# Patient Record
(demographics unavailable — no encounter records)

---

## 2024-10-11 NOTE — ASSESSMENT
[Diabetes Foot Care] : diabetes foot care [Long Term Vascular Complications] : long term vascular complications of diabetes [Carbohydrate Consistent Diet] : carbohydrate consistent diet [Importance of Diet and Exercise] : importance of diet and exercise to improve glycemic control, achieve weight loss and improve cardiovascular health [Exercise/Effect on Glucose] : exercise/effect on glucose [Hypoglycemia Management] : hypoglycemia management [Glucagon Use] : glucagon use [Ketone Testing] : ketone testing [Action and use of Insulin] : action and use of short and long-acting insulin [Self Monitoring of Blood Glucose] : self monitoring of blood glucose [Insulin Self-Administration] : insulin self-administration [Injection Technique, Storage, Sharps Disposal] : injection technique, storage, and sharps disposal [Sick-Day Management] : sick-day management [Retinopathy Screening] : Patient was referred to ophthalmology for retinopathy screening [Diabetic Medications] : Risks and benefits of diabetic medications were discussed [FreeTextEntry1] : T2DM, well controlled - keep off tresiba - Mounajro 12.5 mg qw  - can decrease Metformin 1000 mg 1/2 tab bid and monitor for GI effects (prior w/ some GI intolerance on metformin) - Erythrocytosis could possibly be related to SGLT2 inhibitors - continue Jardiance 10 mg qd  - hypo's precautions reviewed - d/c diovan/HCT and start Diovan 80 mg qd and do home BP monitoring - Crestor 10mg HS - monitor urine microalbumin - cont drisdol 50K q 10 days - Connie 3 - podiatry f/u - advised to d/w PCP further eval for liver cyst  RTC 6 mos, labs in 3 months

## 2024-10-11 NOTE — HISTORY OF PRESENT ILLNESS
[FreeTextEntry1] : 56 yo M f/u for diabetes management  *** Oct 11, 2024 ***  Doing great, offers no new complaints.  Blood pressure has been stable at home.  Regained a few pounds. off Tresiba staying on Mounajro 12.5 mg qw,  Metformin 1000 mg bid, Jardiance 10 mg qd, diovan/HCT  80/12.5mg qd wearing Connie Date of download:  10/11/2024  Diabetes Medications and Dosage: as above Indication for CGMS: verify a change in the treatment regimen, identify periods of hypoglycemia/ hyperglycemia.  Modal day report: pattern.  Pt with HYPO  1% of the time ( NONE below 54),  98% in target range Hyperglycemia:  1% elevation  Identified issues: Falsely low numbers for 1 day at the time of a sensor change. dates analyzed: 09/28/2024 - 10/11/2024  *** Mar 15, 2024 ***  feels well, no new c/o.  weight has been stable taking Tresiba 10 un HS, Mounjaro 12.5 mg qw, Metformin 1000 mg bid,  Jardiance 25 mg qd,diovan/HCT  80/12.5mg qd home BP readings  ave 120's/80's wearing Connie Date of download:  03/15/2024  Diabetes Medications and Dosage: as above Indication for CGMS: verify a change in the treatment regimen, identify periods of hypoglycemia/ hyperglycemia.  Modal day report: pattern.  Pt with HYPO  0% of the time ( NONE below 54),  100% in target range Hyperglycemia:  0% elevation  Identified issues: carbohydrate counting dates analyzed: 03/02/2024 - 03/15/2024  labs reviewed as below  *** Sep 15, 2023 ***  doing great. able to tolerate Mounjaro (on and off  mild diarrhea 36 hrs post injection, very manageable)  saw hepatologist - dx'd with Jimmy CUELLO abd- several simple hepatic cysts; advised to repeat in 1 year decreased Tresiba to 15 un HS, staying on Mounjaro 12.5 mg qw, Metformin 1000 mg bid,  Jardiance 25 mg qd  wearing Connie 3 Date of download:  09/15/2023  Diabetes Medications and Dosage: as above Indication for CGMS: verify a change in the treatment regimen, identify periods of hypoglycemia/ hyperglycemia.  Modal day report: pattern.  Pt with HYPO  0% of the time ( NONE below 54),  100% in target range Hyperglycemia:  0% elevation  Identified issues: carbohydrate counting dates analyzed:  09/02/2023- 09/15/2023  *** Feb 10, 2023 ***  feels great. lost weight on Mounjaro  tresiba 26 un HS, Mounajro 7.5 mg qw,  Metformin 1000 mg bid,  Jardiance 25 mg qd wearing connie 3 Date of download:  02/10/2023  Diabetes Medications and Dosage: as above Indication for CGMS: verify a change in the treatment regimen, identify periods of hypoglycemia/ hyperglycemia.  Modal day report: pattern.  Pt with HYPO  1% of the time ( NONE below 54),  98% in target range Hyperglycemia:  1% elevation  Identified issues: carbohydrate counting dates analyzed: 01/28/2023 - 02/10/2023   *** Oct 07, 2022 ***  doing well. no new c/o. difficulties losing weight. on Tresiba 26 un HS, janumet  50/1000mg bid,  Jardiance 25 mg qd,  diovan/HCT  80/12.5mg bid, crestor 10mg HS Date of download:  10/07/2022  Diabetes Medications and Dosage: as above Indication for CGMS: verify a change in the treatment regimen, identify periods of hypoglycemia/ hyperglycemia.  Modal day report: pattern.  Pt with HYPO  0% of the time ( NONE below 54),  98% in target range Hyperglycemia:  2% elevation  Identified issues: carbohydrate counting dates analyzed: 09/24/2022 - 10/07/2022   *** Mar 31, 2022 ***  taking  Tresiba 22  un HS, cont  janumet  50/1000mg bid,  Jardiance 25 mg qd,  diovan/HCT  80/12.5mg bid, crestor 10mg HS  Date of download:  3/31/22 Diabetes Medications and Dosage: as above Indication for CGMS: verify a change in the treatment regimen, identify periods of hypoglycemia/ hyperglycemia.  Modal day report: pattern.  Pt with HYPO  0% of the time (NONE below 54),  78% in target range Hyperglycemia:  22% elevation  Identified issues: carbohydrate counting dates analyzed:3/18/22-3/31/22   *** Sep 30, 2021 ***  on Tresiba 22  un HS, janumet  50/1000mg bid,  Jardiance 10 mg qd, diovan/HCT  80/12.5mg bid, crestor 10mg HS feels fine, diet is a bit worse. weight is stable. has a dog, plans to walk more with her wearing connie Date of download:  9/30/21 Diabetes Medications and Dosage: as above Indication for CGMS: verify a change in the treatment regimen, identify periods of hypoglycemia/ hyperglycemia.  Modal day report: pattern.  Pt with HYPO  0% of the time (NONE below 54),  90% in target range Hyperglycemia:  10% elevation  Identified issues: carbohydrate counting, spikes post dinner dates analyzed: 9/17/21-9/30/21    *** Apr 27, 2021 ***  feels fine. vaccinated with pfizer on Tresiba 22  un HS, cont  janumet  50/1000mg bid,  steglatro 15mg qd, diovan/HCT  80/12.5mg bid, crestor 10mg HS wearing connie Date of download:  4/27/21 Diabetes Medications and Dosage: as above Indication for CGMS: verify a change in the treatment regimen, identify periods of hypoglycemia/ hyperglycemia.  Modal day report: pattern.  Pt with HYPO  0% of the time (NONE below 54),  92% in target range Hyperglycemia:  8% elevation  Identified issues: carbohydrate counting, occasional post dinner spikes dates analyzed: 4/14/21-4/27/21  labs from 4/21/21- a1c- 6.6, LDL_ 61, normal TSH  *** Sep 14, 2020 ***  on Tresiba 22  un HS, cont  janumet  50/1000mg bid,  steglatro 15mg qd, diovan/HCT  80/12.5mg bid, crestor 10mg HS  Date of download:  9/14/20 Diabetes Medications and Dosage: as above Indication for CGMS: verify a change in the treatment regimen, identify periods of hypoglycemia/ hyperglycemia.  Modal day report: pattern.  Pt with HYPO 0 % of the time (NONE below 54),  99% in target range Hyperglycemia:  1% elevation  Identified issues: occasional dietary changes dates analyzed: 9/1/20-9/14/20   *** Televisit  Jun 04, 2020 ***  on  Tresiba 22  un HS, cont  janumet  50/1000mg bid,  steglatro 15mg qd, diovan/HCT  80/12.5mg bid, crestor 10mg HS  a1c- 6.1, LDL- 55  Date of download:  5/22/20-6/4/20 Diabetes Medications and Dosage: as above Indication for CGMS: verify a change in the treatment regimen, identify periods of hypoglycemia/ hyperglycemia.  Modal day report: pattern.  Pt with HYPO  1% of the time (NONE below 54),  97% in target range Hyperglycemia:  2% elevation  Identified issues:per patient, rechecked FS when connie showed hypos- was 90's dates analyzed:6/4/20   *** Feb 27, 2020 ***  on  Tresiba 24  un HS, janumet  50/1000mg bid,  steglatro 15mg qd , diovan 80/12.5 qd only a1c- 6.2, LDL- 51, TG- 173 feels well. home BP reading SBP in 130's, diastolic in upper 80's  Date of download: 2/27/20  Diabetes Medications and Dosage: as above Indication for CGMS: verify a change in the treatment regimen, identify periods of hypoglycemia/ hyperglycemia.  Modal day report: pattern.  Pt with HYPO  2% of the time (NONE below 54),  97% in target range Hyperglycemia:  1% elevation  Identified issues: carbohydrate counting, few 70's at midnight dates analyzed: 2/14/20- 2/27/20    *** Nov 21, 2019 ***  on  Tresiba 28  un HS, cont  janumet  50/1000mg bid,  steglatro 15mg qd  a1c- 6.1  Date of download:  11/21/19 Diabetes Medications and Dosage: as above Indication for CGMS: verify a change in the treatment regimen, identify periods of hypoglycemia/ hyperglycemia.  Modal day report: pattern.  Pt with HYPO  1% of the time (NONE below 54),  98% in target range Hyperglycemia:  1% elevation  Identified issues: occas low 70's at 2-3 am dates analyzed: 11/8/19-11/21/19   *** Aug 19, 2019 *** on Tresiba 30  un HS,  janumet  50/1000mg bid, steglatro 15mg qd, crestor 10 mg, diovan/HCT  80/12.5mg bid able to tolerate higher dose of janumet within past 3 weeks, prior was taking a pill and half   LDL- 73 <-- 59 <-- 160 a1c-6.2 <-- 5.8 <-- 5.7 <--  5.8 <-- 12.8,  f.am- 228 25D- 29 <-- 40 <--  16 LDL- 55 <-- 68, HDL- 36 <- 36  log: FS- , ac D- , HS -    *** May 16, 2019 ***  feels well. had  colonoscopy- reports nl  urine microalbumins - 425 log : FBS- , ppg-    HISTORY OF PRESENT ILLNESS.    Patient was diagnosed with Diabetes Mellitus Type 2 in 01/2012.    Denies known complications of retinopathy, nephropathy, or neuropathy.   Reports  history  HTN, dyslipidemia. Denies CAD.  previously seen by Dr. Johnson, but has not followed for more than a year.  He's been prior on metformin 1000mg  with dinner, januvia 100mg, amaryl 4mg bid,  losartan 50mg, zocor  20mg. He has ran out of all medications for about 10 months.   Reports some GI intolerance on higher dose of metformin. Diet: not following.  Exercise: none.  **** Last dilated eye exam - 3/4/22 Last podiatry visit - 8/20 (Dr. Bhatt).   Last cardiology evaluation - 7/22 Last stress test - 9/22 Last 2-D Echo - 8/22 carotid doppler - 8/22

## 2025-04-11 NOTE — ASSESSMENT
[Diabetes Foot Care] : diabetes foot care [Long Term Vascular Complications] : long term vascular complications of diabetes [Carbohydrate Consistent Diet] : carbohydrate consistent diet [Importance of Diet and Exercise] : importance of diet and exercise to improve glycemic control, achieve weight loss and improve cardiovascular health [Exercise/Effect on Glucose] : exercise/effect on glucose [Hypoglycemia Management] : hypoglycemia management [Glucagon Use] : glucagon use [Ketone Testing] : ketone testing [Action and use of Insulin] : action and use of short and long-acting insulin [Self Monitoring of Blood Glucose] : self monitoring of blood glucose [Insulin Self-Administration] : insulin self-administration [Injection Technique, Storage, Sharps Disposal] : injection technique, storage, and sharps disposal [Sick-Day Management] : sick-day management [Retinopathy Screening] : Patient was referred to ophthalmology for retinopathy screening [Diabetic Medications] : Risks and benefits of diabetic medications were discussed [FreeTextEntry1] : T2DM, well controlled - keep off tresiba - Mounajro 12.5 mg qw  - Metformin 500 mg  bid and monitor for GI effects (prior w/ some GI intolerance on metformin) - Erythrocytosis could possibly be related to SGLT2 inhibitors - decrease Jardiance 10 mg qod , add ASA 81 mg. retest H/H in 3 months - hypo's precautions reviewed - cont diovan/HCT and do home BP monitoring - Crestor 10mg HS - monitor urine microalbumin - cont drisdol 50K q 10 days - Connie 3 - podiatry f/u - advised to d/w PCP further eval for liver cyst  - colonoscopy this year - annual skin check RTC 6 mos, labs in 3 months (CBC)

## 2025-04-11 NOTE — HISTORY OF PRESENT ILLNESS
[FreeTextEntry1] : 59 yo M f/u for diabetes management  *** Apr 11, 2025 ***  feels well, no new concerns went back to diovan-HCT b/o BP was higher no lightheadedness  remains on Mounajro 12.5 mg qw,  Metformin 1000 mg 1/2 tab bid, Jardiance 10 mg qd, diovan/HCT  80/12.5mg qd labs reviewed as below- a1c- 5.6, Hgb- 17.4 Name: Rich Zamora YOB: 1966 Report Period: 03/28/2025 - 04/10/2025 (14 days) Generated: 04/11/2025 Time CGM Active: 96%   Glucose Statistics and Targets Average Glucose: 135 mg/dL Glucose Management Indicator (GMI): 6.5% Glucose Variability (%CV): 14.7% Target Range: 70 - 180 mg/dL   Time in Ranges Very High: >250 mg/dL --- 0% High: 181 - 250 mg/dL --- 2% Target Range: 70 - 180 mg/dL --- 98% Low: 54 - 69 mg/dL --- 0% Very Low: <54 mg/dL --- 0%  *** Oct 11, 2024 ***  Doing great, offers no new complaints.  Blood pressure has been stable at home.  Regained a few pounds. off Tresiba staying on Mounajro 12.5 mg qw,  Metformin 1000 mg bid, Jardiance 10 mg qd, diovan/HCT  80/12.5mg qd wearing Elvin Date of download:  10/11/2024  Diabetes Medications and Dosage: as above Indication for CGMS: verify a change in the treatment regimen, identify periods of hypoglycemia/ hyperglycemia.  Modal day report: pattern.  Pt with HYPO  1% of the time ( NONE below 54),  98% in target range Hyperglycemia:  1% elevation  Identified issues: Falsely low numbers for 1 day at the time of a sensor change. dates analyzed: 09/28/2024 - 10/11/2024  *** Mar 15, 2024 ***  feels well, no new c/o.  weight has been stable taking Tresiba 10 un HS, Mounjaro 12.5 mg qw, Metformin 1000 mg bid,  Jardiance 25 mg qd,diovan/HCT  80/12.5mg qd home BP readings  ave 120's/80's wearing Elvin Date of download:  03/15/2024  Diabetes Medications and Dosage: as above Indication for CGMS: verify a change in the treatment regimen, identify periods of hypoglycemia/ hyperglycemia.  Modal day report: pattern.  Pt with HYPO  0% of the time ( NONE below 54),  100% in target range Hyperglycemia:  0% elevation  Identified issues: carbohydrate counting dates analyzed: 03/02/2024 - 03/15/2024  labs reviewed as below  *** Sep 15, 2023 ***  doing great. able to tolerate Mounjaro (on and off  mild diarrhea 36 hrs post injection, very manageable)  saw hepatologist - dx'd with Jimmy CUELLO abd- several simple hepatic cysts; advised to repeat in 1 year decreased Tresiba to 15 un HS, staying on Mounjaro 12.5 mg qw, Metformin 1000 mg bid,  Jardiance 25 mg qd  wearing Elvin 3 Date of download:  09/15/2023  Diabetes Medications and Dosage: as above Indication for CGMS: verify a change in the treatment regimen, identify periods of hypoglycemia/ hyperglycemia.  Modal day report: pattern.  Pt with HYPO  0% of the time ( NONE below 54),  100% in target range Hyperglycemia:  0% elevation  Identified issues: carbohydrate counting dates analyzed:  09/02/2023- 09/15/2023  *** Feb 10, 2023 ***  feels great. lost weight on Mounjaro  tresiba 26 un HS, Mounajro 7.5 mg qw,  Metformin 1000 mg bid,  Jardiance 25 mg qd wearing elvin 3 Date of download:  02/10/2023  Diabetes Medications and Dosage: as above Indication for CGMS: verify a change in the treatment regimen, identify periods of hypoglycemia/ hyperglycemia.  Modal day report: pattern.  Pt with HYPO  1% of the time ( NONE below 54),  98% in target range Hyperglycemia:  1% elevation  Identified issues: carbohydrate counting dates analyzed: 01/28/2023 - 02/10/2023   *** Oct 07, 2022 ***  doing well. no new c/o. difficulties losing weight. on Tresiba 26 un HS, janumet  50/1000mg bid,  Jardiance 25 mg qd,  diovan/HCT  80/12.5mg bid, crestor 10mg HS Date of download:  10/07/2022  Diabetes Medications and Dosage: as above Indication for CGMS: verify a change in the treatment regimen, identify periods of hypoglycemia/ hyperglycemia.  Modal day report: pattern.  Pt with HYPO  0% of the time ( NONE below 54),  98% in target range Hyperglycemia:  2% elevation  Identified issues: carbohydrate counting dates analyzed: 09/24/2022 - 10/07/2022   *** Mar 31, 2022 ***  taking  Tresiba 22  un HS, cont  janumet  50/1000mg bid,  Jardiance 25 mg qd,  diovan/HCT  80/12.5mg bid, crestor 10mg HS  Date of download:  3/31/22 Diabetes Medications and Dosage: as above Indication for CGMS: verify a change in the treatment regimen, identify periods of hypoglycemia/ hyperglycemia.  Modal day report: pattern.  Pt with HYPO  0% of the time (NONE below 54),  78% in target range Hyperglycemia:  22% elevation  Identified issues: carbohydrate counting dates analyzed:3/18/22-3/31/22   *** Sep 30, 2021 ***  on Tresiba 22  un HS, janumet  50/1000mg bid,  Jardiance 10 mg qd, diovan/HCT  80/12.5mg bid, crestor 10mg HS feels fine, diet is a bit worse. weight is stable. has a dog, plans to walk more with her wearing elvin Date of download:  9/30/21 Diabetes Medications and Dosage: as above Indication for CGMS: verify a change in the treatment regimen, identify periods of hypoglycemia/ hyperglycemia.  Modal day report: pattern.  Pt with HYPO  0% of the time (NONE below 54),  90% in target range Hyperglycemia:  10% elevation  Identified issues: carbohydrate counting, spikes post dinner dates analyzed: 9/17/21-9/30/21    *** Apr 27, 2021 ***  feels fine. vaccinated with pfizer on Tresiba 22  un HS, cont  janumet  50/1000mg bid,  steglatro 15mg qd, diovan/HCT  80/12.5mg bid, crestor 10mg HS wearing elvin Date of download:  4/27/21 Diabetes Medications and Dosage: as above Indication for CGMS: verify a change in the treatment regimen, identify periods of hypoglycemia/ hyperglycemia.  Modal day report: pattern.  Pt with HYPO  0% of the time (NONE below 54),  92% in target range Hyperglycemia:  8% elevation  Identified issues: carbohydrate counting, occasional post dinner spikes dates analyzed: 4/14/21-4/27/21  labs from 4/21/21- a1c- 6.6, LDL_ 61, normal TSH  *** Sep 14, 2020 ***  on Tresiba 22  un HS, cont  janumet  50/1000mg bid,  steglatro 15mg qd, diovan/HCT  80/12.5mg bid, crestor 10mg HS  Date of download:  9/14/20 Diabetes Medications and Dosage: as above Indication for CGMS: verify a change in the treatment regimen, identify periods of hypoglycemia/ hyperglycemia.  Modal day report: pattern.  Pt with HYPO 0 % of the time (NONE below 54),  99% in target range Hyperglycemia:  1% elevation  Identified issues: occasional dietary changes dates analyzed: 9/1/20-9/14/20   *** Televisit  Jun 04, 2020 ***  on  Tresiba 22  un HS, cont  janumet  50/1000mg bid,  steglatro 15mg qd, diovan/HCT  80/12.5mg bid, crestor 10mg HS  a1c- 6.1, LDL- 55  Date of download:  5/22/20-6/4/20 Diabetes Medications and Dosage: as above Indication for CGMS: verify a change in the treatment regimen, identify periods of hypoglycemia/ hyperglycemia.  Modal day report: pattern.  Pt with HYPO  1% of the time (NONE below 54),  97% in target range Hyperglycemia:  2% elevation  Identified issues:per patient, rechecked FS when elvin showed hypos- was 90's dates analyzed:6/4/20   *** Feb 27, 2020 ***  on  Tresiba 24  un HS, janumet  50/1000mg bid,  steglatro 15mg qd , diovan 80/12.5 qd only a1c- 6.2, LDL- 51, TG- 173 feels well. home BP reading SBP in 130's, diastolic in upper 80's  Date of download: 2/27/20  Diabetes Medications and Dosage: as above Indication for CGMS: verify a change in the treatment regimen, identify periods of hypoglycemia/ hyperglycemia.  Modal day report: pattern.  Pt with HYPO  2% of the time (NONE below 54),  97% in target range Hyperglycemia:  1% elevation  Identified issues: carbohydrate counting, few 70's at midnight dates analyzed: 2/14/20- 2/27/20    *** Nov 21, 2019 ***  on  Tresiba 28  un HS, cont  janumet  50/1000mg bid,  steglatro 15mg qd  a1c- 6.1  Date of download:  11/21/19 Diabetes Medications and Dosage: as above Indication for CGMS: verify a change in the treatment regimen, identify periods of hypoglycemia/ hyperglycemia.  Modal day report: pattern.  Pt with HYPO  1% of the time (NONE below 54),  98% in target range Hyperglycemia:  1% elevation  Identified issues: occas low 70's at 2-3 am dates analyzed: 11/8/19-11/21/19   *** Aug 19, 2019 *** on Tresiba 30  un HS,  janumet  50/1000mg bid, steglatro 15mg qd, crestor 10 mg, diovan/HCT  80/12.5mg bid able to tolerate higher dose of janumet within past 3 weeks, prior was taking a pill and half   LDL- 73 <-- 59 <-- 160 a1c-6.2 <-- 5.8 <-- 5.7 <--  5.8 <-- 12.8,  f.am- 228 25D- 29 <-- 40 <--  16 LDL- 55 <-- 68, HDL- 36 <- 36  log: FS- , ac D- , HS -    *** May 16, 2019 ***  feels well. had  colonoscopy- reports nl  urine microalbumins - 425 log : FBS- , ppg-    HISTORY OF PRESENT ILLNESS.    Patient was diagnosed with Diabetes Mellitus Type 2 in 01/2012.    Denies known complications of retinopathy, nephropathy, or neuropathy.   Reports  history  HTN, dyslipidemia. Denies CAD.  previously seen by Dr. Johnson, but has not followed for more than a year.  He's been prior on metformin 1000mg  with dinner, januvia 100mg, amaryl 4mg bid,  losartan 50mg, zocor  20mg. He has ran out of all medications for about 10 months.   Reports some GI intolerance on higher dose of metformin. Diet: not following.  Exercise: none.  **** Last dilated eye exam - 01/25, no DRP as per patient Last podiatry visit - 8/20 (Dr. Bhatt).   Last cardiology evaluation - 7/22 Last stress test - 9/22 Last 2-D Echo - 8/22 carotid doppler - 8/22